# Patient Record
Sex: MALE | Race: OTHER | HISPANIC OR LATINO | URBAN - METROPOLITAN AREA
[De-identification: names, ages, dates, MRNs, and addresses within clinical notes are randomized per-mention and may not be internally consistent; named-entity substitution may affect disease eponyms.]

---

## 2020-06-28 ENCOUNTER — EMERGENCY (EMERGENCY)
Facility: HOSPITAL | Age: 20
LOS: 1 days | Discharge: HOME | End: 2020-06-28
Attending: EMERGENCY MEDICINE
Payer: MEDICAID

## 2020-06-28 VITALS
RESPIRATION RATE: 18 BRPM | SYSTOLIC BLOOD PRESSURE: 124 MMHG | HEART RATE: 94 BPM | DIASTOLIC BLOOD PRESSURE: 88 MMHG | TEMPERATURE: 98 F | WEIGHT: 184.97 LBS | HEIGHT: 71 IN | OXYGEN SATURATION: 99 %

## 2020-06-28 PROCEDURE — 73562 X-RAY EXAM OF KNEE 3: CPT | Mod: 26,LT

## 2020-06-28 PROCEDURE — 71046 X-RAY EXAM CHEST 2 VIEWS: CPT

## 2020-06-28 PROCEDURE — 70450 CT HEAD/BRAIN W/O DYE: CPT | Mod: 26

## 2020-06-28 PROCEDURE — 71046 X-RAY EXAM CHEST 2 VIEWS: CPT | Mod: 26

## 2020-06-28 PROCEDURE — 72125 CT NECK SPINE W/O DYE: CPT | Mod: 26

## 2020-06-28 PROCEDURE — 73090 X-RAY EXAM OF FOREARM: CPT

## 2020-06-28 PROCEDURE — 70450 CT HEAD/BRAIN W/O DYE: CPT

## 2020-06-28 PROCEDURE — 72125 CT NECK SPINE W/O DYE: CPT

## 2020-06-28 PROCEDURE — 99285 EMERGENCY DEPT VISIT HI MDM: CPT

## 2020-06-28 PROCEDURE — 73562 X-RAY EXAM OF KNEE 3: CPT

## 2020-06-28 PROCEDURE — 73090 X-RAY EXAM OF FOREARM: CPT | Mod: 26,LT

## 2020-06-28 PROCEDURE — 99284 EMERGENCY DEPT VISIT MOD MDM: CPT | Mod: 25

## 2020-06-28 RX ORDER — IBUPROFEN 200 MG
600 TABLET ORAL ONCE
Refills: 0 | Status: COMPLETED | OUTPATIENT
Start: 2020-06-28 | End: 2020-06-28

## 2020-06-28 RX ORDER — METHOCARBAMOL 500 MG/1
1500 TABLET, FILM COATED ORAL ONCE
Refills: 0 | Status: COMPLETED | OUTPATIENT
Start: 2020-06-28 | End: 2020-06-28

## 2020-06-28 RX ADMIN — Medication 600 MILLIGRAM(S): at 16:11

## 2020-06-28 RX ADMIN — METHOCARBAMOL 1500 MILLIGRAM(S): 500 TABLET, FILM COATED ORAL at 16:11

## 2020-06-28 NOTE — ED PROVIDER NOTE - NS ED ROS FT
No fever/chills, No photophobia/eye pain/changes in vision, No ear pain/sore throat/dysphagia, No chest pain/palpitations, no SOB/cough/wheeze/stridor, No abdominal pain, No N/V/D, no dysuria/frequency/discharge, +neck/-back pain, no rash, no changes in neurological status/function.

## 2020-06-28 NOTE — ED PROVIDER NOTE - PHYSICAL EXAMINATION
Constitutional - well-developed; well nourished. Head - NCAT. Airway patent. Eyes - PERRL +swollen and echymotic L eye. CV - RRR. no murmur. no edema. Pulm - CTAB. Abd - soft, nt. no rebound. no guarding. Neuro - A&Ox3. strength 5/5 x4. sensation intact x4. normal gait. Skin - multiple abrasions to b/l UE and LE. MSK - normal ROM.
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
## 2020-06-28 NOTE — ED PROVIDER NOTE - PATIENT PORTAL LINK FT
You can access the FollowMyHealth Patient Portal offered by [facility] by registering at the following website: [email or web address]. By joining [employer]’s FollowMyHealth portal, you will also be able to view your health information using other applications (apps) compatible with our system.
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
## 2020-10-18 ENCOUNTER — EMERGENCY (EMERGENCY)
Facility: HOSPITAL | Age: 20
LOS: 1 days | Discharge: HOME | End: 2020-10-18
Attending: STUDENT IN AN ORGANIZED HEALTH CARE EDUCATION/TRAINING PROGRAM
Payer: COMMERCIAL

## 2020-10-18 VITALS
OXYGEN SATURATION: 100 % | RESPIRATION RATE: 18 BRPM | DIASTOLIC BLOOD PRESSURE: 78 MMHG | HEART RATE: 86 BPM | WEIGHT: 197.98 LBS | SYSTOLIC BLOOD PRESSURE: 136 MMHG | TEMPERATURE: 98 F | HEIGHT: 71 IN

## 2020-10-18 PROCEDURE — 99053 MED SERV 10PM-8AM 24 HR FAC: CPT

## 2020-10-18 PROCEDURE — 99284 EMERGENCY DEPT VISIT MOD MDM: CPT

## 2020-10-18 RX ORDER — ACETAMINOPHEN 500 MG
975 TABLET ORAL ONCE
Refills: 0 | Status: COMPLETED | OUTPATIENT
Start: 2020-10-18 | End: 2020-10-18

## 2020-10-18 NOTE — ED PROVIDER NOTE - PHYSICAL EXAMINATION
HEENT: atraumatic, no racoon eyes, no pimentel sings, no hemotynpaum, PERRL, EOMI, no nystagmus, no dental injuries  Neck: supple, no midline tenderness to palpation, + FROM,  no abrasions, no echymosis  Chest: left sided tenderness over ribs on palpation, equal expansion bilaterally, no echymosis, no abrasions, seatbelt sign negative.  Lungs: CTA, good air entry bilaterally, no wheezing, no rales, no rhonchi  Abdomen: soft, non tender, no guarding, no rebound, no distention, no echymosis  Back: no midline tenderness to palpation   Extremities: tenderness over knees bilaterally and ankle b/l, FROM in extremities, DP palpable   Skin: no rash  Neuro: A & O x 3, clear speech, steady gait, cn ii-xii intact, cerrebellar intact, no focal deficits.
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
## 2020-10-18 NOTE — ED PROVIDER NOTE - PROGRESS NOTE DETAILS
reviewed xrays ct head, abd soft nontender spine no midline tenderness on palpation no bony step offs or deformities felt on palpation no neuro deficits on exam

## 2020-10-18 NOTE — ED PROVIDER NOTE - PATIENT PORTAL LINK FT
You can access the FollowMyHealth Patient Portal offered by [facility] by registering at the following website: [email or web address]. By joining "[employer]’s FollowMyHealth portal, you will also be able to view your health information using other applications (apps) compatible with our system.
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
## 2020-10-19 PROCEDURE — 70450 CT HEAD/BRAIN W/O DYE: CPT | Mod: 26

## 2020-10-19 PROCEDURE — 71101 X-RAY EXAM UNILAT RIBS/CHEST: CPT | Mod: 26

## 2020-10-19 PROCEDURE — 99284 EMERGENCY DEPT VISIT MOD MDM: CPT | Mod: 25

## 2020-10-19 PROCEDURE — 70450 CT HEAD/BRAIN W/O DYE: CPT

## 2020-10-19 PROCEDURE — 73610 X-RAY EXAM OF ANKLE: CPT | Mod: 26,50

## 2020-10-19 PROCEDURE — 71101 X-RAY EXAM UNILAT RIBS/CHEST: CPT

## 2020-10-19 PROCEDURE — 73564 X-RAY EXAM KNEE 4 OR MORE: CPT

## 2020-10-19 PROCEDURE — 73610 X-RAY EXAM OF ANKLE: CPT

## 2020-10-19 PROCEDURE — 73564 X-RAY EXAM KNEE 4 OR MORE: CPT | Mod: 26,50

## 2020-10-19 RX ADMIN — Medication 975 MILLIGRAM(S): at 00:35

## 2025-03-24 NOTE — ED ADULT TRIAGE NOTE - HEART RATE (BEATS/MIN)
Discharge Summary    Name: Naveen Alvarez  [id number]  YOB: 1937 (Age: 87 y.o.)   Date of Admission: 3/22/2025  Date of Discharge: 3/24/2025  Attending Physician: Marti Fowler MD    Discharge Diagnosis:   Acute on chronic kidney disease  Ambulatory dysfunction    Consultations:  None      Brief Admission History/Reason for Admission   Naveen Alvarez is a 87 y.o.  male with PMHx significant for HFpEF, CKD stage 3a, hypertension, hyperlipidemia, GERD, prior CVA, DM Type 2, lumbar stenosis with neurogenic claudication and BPH who presented to the ED with complaints of bilateral lower extremity weakness and ambulatory dysfunction.  He states he has been going to outpatient physical therapy however he has had no improvement and has difficulty with his ADL's.  Given progressive decline he presented to the ED for further evaluation.     When he arrived to the ED he was afebrile and lab data obtained noted a BUN 25, Creatine 1.59 otherwise labs were unremarkable.  CT scan of the lumbar spine noted no evidence of fracture or metastatic disease. Severe multilevel degenerative changes with canal stenosis and foraminal narrowing.  He was admitted to the Hospitalist service for further management.  Please see H&P for additional details.    Brief Hospital Course by Main Problems:     Acute on chronic kidney disease stage 3a - Resolved  HFpEF with bilateral lower extremity edema  -acute on CKD secondary to poor oral intake.    -we encouraged increased oral hydration.  Renal function the following morning noted his creatine was at baseline.    -UA negative for proteinuria, last 2D echocardiogram 2/24 noted EF 60-65% with moderate aortic valve stenosis and moderate mitral valve regurgitation  -continue outpatient Bumex    2.   Ambulatory dysfunction  -patient denies any recent falls, has been seen by PT outpatient (last visit 3/6) with no improvement in weakness  -PT consult 
86